# Patient Record
Sex: MALE | Race: WHITE | Employment: PART TIME | URBAN - METROPOLITAN AREA
[De-identification: names, ages, dates, MRNs, and addresses within clinical notes are randomized per-mention and may not be internally consistent; named-entity substitution may affect disease eponyms.]

---

## 2017-07-10 ENCOUNTER — OFFICE VISIT (OUTPATIENT)
Dept: FAMILY MEDICINE | Facility: CLINIC | Age: 32
End: 2017-07-10
Payer: COMMERCIAL

## 2017-07-10 VITALS
HEIGHT: 68 IN | RESPIRATION RATE: 16 BRPM | TEMPERATURE: 98.3 F | BODY MASS INDEX: 28.34 KG/M2 | DIASTOLIC BLOOD PRESSURE: 85 MMHG | HEART RATE: 91 BPM | SYSTOLIC BLOOD PRESSURE: 132 MMHG | WEIGHT: 187 LBS

## 2017-07-10 DIAGNOSIS — R10.31 GROIN PAIN, RIGHT: ICD-10-CM

## 2017-07-10 DIAGNOSIS — M25.552 HIP PAIN, LEFT: ICD-10-CM

## 2017-07-10 DIAGNOSIS — R10.9 ABDOMINAL PAIN, UNSPECIFIED LOCATION: Primary | ICD-10-CM

## 2017-07-10 LAB
ERYTHROCYTE [DISTWIDTH] IN BLOOD BY AUTOMATED COUNT: 12.1 % (ref 10–15)
ERYTHROCYTE [SEDIMENTATION RATE] IN BLOOD BY WESTERGREN METHOD: 8 MM/H (ref 0–15)
HCT VFR BLD AUTO: 43.7 % (ref 40–53)
HGB BLD-MCNC: 15.1 G/DL (ref 13.3–17.7)
MCH RBC QN AUTO: 29.8 PG (ref 26.5–33)
MCHC RBC AUTO-ENTMCNC: 34.6 G/DL (ref 31.5–36.5)
MCV RBC AUTO: 86 FL (ref 78–100)
PLATELET # BLD AUTO: 340 10E9/L (ref 150–450)
RBC # BLD AUTO: 5.07 10E12/L (ref 4.4–5.9)
WBC # BLD AUTO: 13.2 10E9/L (ref 4–11)

## 2017-07-10 PROCEDURE — 85652 RBC SED RATE AUTOMATED: CPT | Performed by: PHYSICIAN ASSISTANT

## 2017-07-10 PROCEDURE — 99214 OFFICE O/P EST MOD 30 MIN: CPT | Performed by: PHYSICIAN ASSISTANT

## 2017-07-10 PROCEDURE — 85027 COMPLETE CBC AUTOMATED: CPT | Performed by: PHYSICIAN ASSISTANT

## 2017-07-10 PROCEDURE — 36415 COLL VENOUS BLD VENIPUNCTURE: CPT | Performed by: PHYSICIAN ASSISTANT

## 2017-07-10 NOTE — NURSING NOTE
"Chief Complaint   Patient presents with     Musculoskeletal Problem       Initial /85 (BP Location: Right arm, Patient Position: Chair, Cuff Size: Adult Large)  Pulse 91  Temp 98.3  F (36.8  C) (Tympanic)  Resp 16  Ht 5' 8\" (1.727 m)  Wt 187 lb (84.8 kg)  BMI 28.43 kg/m2 Estimated body mass index is 28.43 kg/(m^2) as calculated from the following:    Height as of this encounter: 5' 8\" (1.727 m).    Weight as of this encounter: 187 lb (84.8 kg).  Medication Reconciliation: complete    Health Maintenance that is potentially due pending provider review:  NONE    Joan EVANS MA        "

## 2017-07-10 NOTE — PROGRESS NOTES
"  SUBJECTIVE:                                                    Marcos Obrien is a 31 year old male who presents to clinic today for the following health issues:      * Hip pain- Right hip, Has been going on for about 6 months, but on Friday became worse.  Sitting and stairs make the pain worse.  Has tried Aleve, essential oils, no ice/heat.  Was in about 10 years ago, was diagnosed with a hernia, pain is in same area    Started in R hip, could barely walk this weekend.  Now seems to be moving and more in abdomen.  Less pain if keeps moving, but hurts if strenuous movement - stairs, jumping, sitting.  Not affected by food or alcohol.  No nausea.  No change in appetite or BMs.  No urinary symptoms.  No history kidney stones.  Feels had similar symptoms years ago diagnosed as hernia, but Dr Garner's note specifically states no hernia.  Patient does state had a pain in groin with reducible bulge.    Problem list and histories reviewed & adjusted, as indicated.  Additional history: as documented    Reviewed and updated as needed this visit by clinical staff       Reviewed and updated as needed this visit by Provider         ROS:  Constitutional, GI, , musculoskeletal systems are negative, except as otherwise noted.    OBJECTIVE:     /85 (BP Location: Right arm, Patient Position: Chair, Cuff Size: Adult Large)  Pulse 91  Temp 98.3  F (36.8  C) (Tympanic)  Resp 16  Ht 5' 8\" (1.727 m)  Wt 187 lb (84.8 kg)  BMI 28.43 kg/m2  Body mass index is 28.43 kg/(m^2).  GENERAL: healthy, alert and no distress  ABDOMEN: soft, tender near upper left of umbilicus and also suprapubic, no hepatosplenomegaly, no masses and bowel sounds normal.  Neg heel jar and psoas and obturator signs.  Hip: pain is lateral hip and not currently tender to palpation but patient states was tender here over weekend.  Normal int and ext rotation, flexion, abduction and adduction.    : normal penis without discharge, normal testicles " without lump, firmness or asymmetry, and no hernia appreciated.  Groin pain is at location typical of inguinal hernia.    ASSESSMENT/PLAN:       ICD-10-CM    1. Abdominal pain, unspecified location R10.9 CBC with platelets     Erythrocyte sedimentation rate auto   2. Hip pain, left M25.552 CBC with platelets     Erythrocyte sedimentation rate auto   3. Groin pain, right R10.31 CBC with platelets     Erythrocyte sedimentation rate auto       Patient Instructions   No obvious hernia on exam today or with general surgeon in 2007, but what you described of having to push in 10 yrs ago could have been hernia.  Would be seen on CT.    Abdominal pain - unclear cause.  Some aspects suggest could be appendix, but pain is not classic and in more locations.  Can cause groin pain, not outside hip pain.      Hip - vague, doesn't fit any one particular diagnosis.    Collectively - decided to do labs to check a bit for appendicitis.  Only can be fully ruled out by CT.  If labs abnormal, get CT.  If pain starts worsening again, get CT.    Call if questions.      Rosemary To PA-C  [facility]

## 2017-07-10 NOTE — MR AVS SNAPSHOT
After Visit Summary   7/10/2017    Marcos Obrien    MRN: [id number]           Patient Information     Date Of Birth          1985        Visit Information        Provider Department      7/10/2017 6:20 PM Rosemary To PA-C [facility]        Today's Diagnoses     Abdominal pain, unspecified location    -  1    Hip pain, left        Groin pain, right          Care Instructions    No obvious hernia on exam today or with general surgeon in 2007, but what you described of having to push in 10 yrs ago could have been hernia.  Would be seen on CT.    Abdominal pain - unclear cause.  Some aspects suggest could be appendix, but pain is not classic and in more locations.  Can cause groin pain, not outside hip pain.      Hip - vague, doesn't fit any one particular diagnosis.    Collectively - decided to do labs to check a bit for appendicitis.  Only can be fully ruled out by CT.  If labs abnormal, get CT.  If pain starts worsening again, get CT.    Call if questions.          Follow-ups after your visit        Who to contact     If you have questions or need follow up information about today's clinic visit or your schedule please contact [facility] directly at [phone].  Normal or non-critical lab and imaging results will be communicated to you by [employer]art, letter or phone within 4 business days after the clinic has received the results. If you do not hear from us within 7 days, please contact the clinic through [employer] or phone. If you have a critical or abnormal lab result, we will notify you by phone as soon as possible.  Submit refill requests through userfox or call your pharmacy and they will forward the refill request to us. Please allow 3 business days for your refill to be completed.          Additional Information About Your Visit        [employer] Information     userfox lets you send messages to your doctor, view your test results, renew your  "prescriptions, schedule appointments and more. To sign up, go to [email or web address] . Click on \"Log in\" on the left side of the screen, which will take you to the Welcome page. Then click on \"Sign up Now\" on the right side of the page.     You will be asked to enter the access code listed below, as well as some personal information. Please follow the directions to create your username and password.     Your access code is: [email or web address]  Expires: 10/8/2017  6:50 PM     Your access code will  in 90 days. If you need help or a new code, please call your [place] clinic or [phone].        Care EveryWhere ID     This is your Care EveryWhere ID. This could be used by other organizations to access your [place] medical records  [id number]        Your Vitals Were     Pulse Temperature Respirations Height BMI (Body Mass Index)       91 98.3  F (36.8  C) (Tympanic) 16 5' 8\" (1.727 m) 28.43 kg/m2        Blood Pressure from Last 3 Encounters:   07/10/17 132/85   01/09/15 122/64   14 129/74    Weight from Last 3 Encounters:   07/10/17 187 lb (84.8 kg)   12 174 lb (78.9 kg)   07 180 lb (81.6 kg)              We Performed the Following     CBC with platelets     Erythrocyte sedimentation rate auto          Today's Medication Changes          These changes are accurate as of: 7/10/17  6:50 PM.  If you have any questions, ask your nurse or doctor.               Stop taking these medicines if you haven't already. Please contact your care team if you have questions.     albuterol 108 (90 BASE) MCG/ACT Inhaler   Commonly known as:  albuterol   Stopped by:  Rosemary To PA-C           guaiFENesin-codeine 100-10 MG/5ML Soln solution   Commonly known as:  ROBITUSSIN AC   Stopped by:  Rosemary To PA-C           hydrocortisone 2.5 % cream   Commonly known as:  ANUSOL-HC   Stopped by:  Rosemary To PA-C                    Primary Care Provider Office Phone # Fax #    Rosemary " SERGIO To PA-C [phone]       [facility] [address] [facility] [address]        Equal Access to Services     JOEY PETER : Hadii aad ku hadaimedayo Sokushali, watamda luqadaha, qajose manuelta kaalmada caterina, tessa jakein hayaashahla barrettjoanna will laDezcasie white. So [facility] [phone].    ATENCIÓN: Si habla español, tiene a ch disposición servicios gratuitos de asistencia lingüística. Llame al [phone].    We comply with applicable federal civil rights laws and [place] laws. We do not discriminate on the basis of race, color, national origin, age, disability sex, sexual orientation or gender identity.            Thank you!     Thank you for choosing [facility]  for your care. Our goal is always to provide you with excellent care. Hearing back from our patients is one way we can continue to improve our services. Please take a few minutes to complete the written survey that you may receive in the mail after your visit with us. Thank you!             Your Updated Medication List - Protect others around you: Learn how to safely use, store and throw away your medicines at www.disposemymeds.org.          This list is accurate as of: 7/10/17  6:50 PM.  Always use your most recent med list.                   Brand Name Dispense Instructions for use Diagnosis    NO ACTIVE MEDICATIONS      .

## 2017-07-10 NOTE — PATIENT INSTRUCTIONS
No obvious hernia on exam today or with general surgeon in 2007, but what you described of having to push in 10 yrs ago could have been hernia.  Would be seen on CT.    Abdominal pain - unclear cause.  Some aspects suggest could be appendix, but pain is not classic and in more locations.  Can cause groin pain, not outside hip pain.      Hip - vague, doesn't fit any one particular diagnosis.    Collectively - decided to do labs to check a bit for appendicitis.  Only can be fully ruled out by CT.  If labs abnormal, get CT.  If pain starts worsening again, get CT.    Call if questions.

## 2018-07-13 ENCOUNTER — TELEPHONE (OUTPATIENT)
Dept: FAMILY MEDICINE | Facility: CLINIC | Age: 33
End: 2018-07-13

## 2018-07-13 ENCOUNTER — APPOINTMENT (OUTPATIENT)
Dept: CT IMAGING | Facility: CLINIC | Age: 33
End: 2018-07-13
Attending: EMERGENCY MEDICINE
Payer: COMMERCIAL

## 2018-07-13 ENCOUNTER — HOSPITAL ENCOUNTER (EMERGENCY)
Facility: CLINIC | Age: 33
Discharge: HOME | End: 2018-07-13
Attending: EMERGENCY MEDICINE | Admitting: EMERGENCY MEDICINE
Payer: COMMERCIAL

## 2018-07-13 VITALS
RESPIRATION RATE: 14 BRPM | DIASTOLIC BLOOD PRESSURE: 91 MMHG | OXYGEN SATURATION: 96 % | SYSTOLIC BLOOD PRESSURE: 128 MMHG | TEMPERATURE: 98.1 F

## 2018-07-13 DIAGNOSIS — R10.31 ABDOMINAL PAIN, RIGHT LOWER QUADRANT: ICD-10-CM

## 2018-07-13 LAB
ALBUMIN SERPL-MCNC: 3.9 G/DL (ref 3.4–5)
ALBUMIN UR-MCNC: NEGATIVE MG/DL
ALP SERPL-CCNC: 72 U/L (ref 40–150)
ALT SERPL W P-5'-P-CCNC: 24 U/L (ref 0–70)
ANION GAP SERPL CALCULATED.3IONS-SCNC: 4 MMOL/L (ref 3–14)
APPEARANCE UR: CLEAR
AST SERPL W P-5'-P-CCNC: 13 U/L (ref 0–45)
BASOPHILS # BLD AUTO: 0.1 10E9/L (ref 0–0.2)
BASOPHILS NFR BLD AUTO: 0.6 %
BILIRUB SERPL-MCNC: 0.4 MG/DL (ref 0.2–1.3)
BILIRUB UR QL STRIP: NEGATIVE
BUN SERPL-MCNC: 18 MG/DL (ref 7–30)
CALCIUM SERPL-MCNC: 9.2 MG/DL (ref 8.5–10.1)
CHLORIDE SERPL-SCNC: 106 MMOL/L (ref 94–109)
CO2 SERPL-SCNC: 30 MMOL/L (ref 20–32)
COLOR UR AUTO: ABNORMAL
CREAT SERPL-MCNC: 0.98 MG/DL (ref 0.66–1.25)
DIFFERENTIAL METHOD BLD: NORMAL
EOSINOPHIL # BLD AUTO: 0.4 10E9/L (ref 0–0.7)
EOSINOPHIL NFR BLD AUTO: 3.5 %
ERYTHROCYTE [DISTWIDTH] IN BLOOD BY AUTOMATED COUNT: 11.4 % (ref 10–15)
GFR SERPL CREATININE-BSD FRML MDRD: 87 ML/MIN/1.7M2
GLUCOSE SERPL-MCNC: 94 MG/DL (ref 70–99)
GLUCOSE UR STRIP-MCNC: NEGATIVE MG/DL
HCT VFR BLD AUTO: 44.7 % (ref 40–53)
HGB BLD-MCNC: 14.7 G/DL (ref 13.3–17.7)
HGB UR QL STRIP: ABNORMAL
IMM GRANULOCYTES # BLD: 0 10E9/L (ref 0–0.4)
IMM GRANULOCYTES NFR BLD: 0.3 %
KETONES UR STRIP-MCNC: NEGATIVE MG/DL
LEUKOCYTE ESTERASE UR QL STRIP: NEGATIVE
LYMPHOCYTES # BLD AUTO: 2.2 10E9/L (ref 0.8–5.3)
LYMPHOCYTES NFR BLD AUTO: 21.3 %
MCH RBC QN AUTO: 29.1 PG (ref 26.5–33)
MCHC RBC AUTO-ENTMCNC: 32.9 G/DL (ref 31.5–36.5)
MCV RBC AUTO: 89 FL (ref 78–100)
MONOCYTES # BLD AUTO: 0.7 10E9/L (ref 0–1.3)
MONOCYTES NFR BLD AUTO: 6.4 %
NEUTROPHILS # BLD AUTO: 7 10E9/L (ref 1.6–8.3)
NEUTROPHILS NFR BLD AUTO: 67.9 %
NITRATE UR QL: NEGATIVE
NRBC # BLD AUTO: 0 10*3/UL
NRBC BLD AUTO-RTO: 0 /100
PH UR STRIP: 6 PH (ref 5–7)
PLATELET # BLD AUTO: 304 10E9/L (ref 150–450)
POTASSIUM SERPL-SCNC: 4.3 MMOL/L (ref 3.4–5.3)
PROT SERPL-MCNC: 7.7 G/DL (ref 6.8–8.8)
RBC # BLD AUTO: 5.05 10E12/L (ref 4.4–5.9)
RBC #/AREA URNS AUTO: 1 /HPF (ref 0–2)
SODIUM SERPL-SCNC: 140 MMOL/L (ref 133–144)
SOURCE: ABNORMAL
SP GR UR STRIP: 1.05 (ref 1–1.03)
SQUAMOUS #/AREA URNS AUTO: <1 /HPF (ref 0–1)
UROBILINOGEN UR STRIP-MCNC: 0 MG/DL (ref 0–2)
WBC # BLD AUTO: 10.3 10E9/L (ref 4–11)
WBC #/AREA URNS AUTO: <1 /HPF (ref 0–5)

## 2018-07-13 PROCEDURE — 80053 COMPREHEN METABOLIC PANEL: CPT | Performed by: EMERGENCY MEDICINE

## 2018-07-13 PROCEDURE — 81003 URINALYSIS AUTO W/O SCOPE: CPT | Performed by: EMERGENCY MEDICINE

## 2018-07-13 PROCEDURE — 25000128 H RX IP 250 OP 636: Performed by: EMERGENCY MEDICINE

## 2018-07-13 PROCEDURE — 99284 EMERGENCY DEPT VISIT MOD MDM: CPT | Mod: Z6 | Performed by: EMERGENCY MEDICINE

## 2018-07-13 PROCEDURE — 99284 EMERGENCY DEPT VISIT MOD MDM: CPT | Mod: 25

## 2018-07-13 PROCEDURE — 85025 COMPLETE CBC W/AUTO DIFF WBC: CPT | Performed by: EMERGENCY MEDICINE

## 2018-07-13 PROCEDURE — 74177 CT ABD & PELVIS W/CONTRAST: CPT

## 2018-07-13 PROCEDURE — 25000125 ZZHC RX 250: Performed by: EMERGENCY MEDICINE

## 2018-07-13 RX ORDER — IOPAMIDOL 755 MG/ML
91 INJECTION, SOLUTION INTRAVASCULAR ONCE
Status: COMPLETED | OUTPATIENT
Start: 2018-07-13 | End: 2018-07-13

## 2018-07-13 RX ADMIN — SODIUM CHLORIDE 73 ML: 9 INJECTION, SOLUTION INTRAVENOUS at 15:09

## 2018-07-13 RX ADMIN — IOPAMIDOL 91 ML: 755 INJECTION, SOLUTION INTRAVENOUS at 15:09

## 2018-07-13 NOTE — ED PROVIDER NOTES
History     Chief Complaint   Patient presents with     Abdominal Pain     RLQ into testicle, started 2 days ago     HPI  Marcos Obrien is a 32 year old male who presents the right lower quadrant abdominal pain.  Onset 2 days ago.  States discomfort at times moves into the testicle.  Denies fever, chills, night sweats.  Normal appetite.  No constipation or diarrhea.  Denies dysuria urgency or frequency.  Rates abdominal pain 2/10 intensity.  Has not altered activity.  No abdominal wall injury.  Recent heavy lifting or straining.     Problem List:    There are no active problems to display for this patient.       Past Medical History:    Past Medical History:   Diagnosis Date     Tobacco use disorder age 10       Past Surgical History:    No past surgical history on file.    Family History:    Family History   Problem Relation Age of Onset     Family History Negative No family hx of        Social History:  Marital Status:  Single [1]  Social History   Substance Use Topics     Smoking status: Former Smoker     Packs/day: 1.00     Years: 12.00     Types: Cigarettes     Quit date: 4/9/2012     Smokeless tobacco: Never Used     Alcohol use Yes        Medications:      NO ACTIVE MEDICATIONS         Review of Systems  All pertinent positives and negatives are documented in the HPI.  All others reviewed and are negative .    Physical Exam   BP: 131/88  Heart Rate: 67  Temp: 98.1  F (36.7  C)  Resp: 14  SpO2: 100 %      Physical Exam  Vital signs reviewed  Head:  Normocephalic.    Eyes:  PERRLA, full EOM.  External exams normal.    Ears:  Normal pinnae, canals, and TM's.    Nose:  Patent, without deformity.    Throat:  Moist mucous membranes without lesions, erythema, or exudate.    Neck:  Supple, without masses, lymphadenopathy or tenderness.    Respiratory:  Normal respiratory effort.  Lungs are clear with good breath sounds.    Heart:  RR without murmurs, rubs, or gallops.  Abdomen: No distention.  Bowel sounds  present.  Pain localizes with deep firm palpation right lower quadrant only.  There is no guarding or rebound.  Negative Rovsing sign.  Genital: Normal male genitalia.  Circumcised.  Testes normal.  Scrotum normal.  No inguinal hernia.  No inguinal pain.  No clinical signs for epididymitis, orchitis, hydrocele.  Skin:  Smooth without excessive sweating, with normal hair distribution.  No suspicious lesions visible.        ED Course     ED Course     Procedures                   Results for orders placed or performed during the hospital encounter of 07/13/18 (from the past 24 hour(s))   CBC with platelets differential   Result Value Ref Range    WBC 10.3 4.0 - 11.0 10e9/L    RBC Count 5.05 4.4 - 5.9 10e12/L    Hemoglobin 14.7 13.3 - 17.7 g/dL    Hematocrit 44.7 40.0 - 53.0 %    MCV 89 78 - 100 fl    MCH 29.1 26.5 - 33.0 pg    MCHC 32.9 31.5 - 36.5 g/dL    RDW 11.4 10.0 - 15.0 %    Platelet Count 304 150 - 450 10e9/L    Diff Method Automated Method     % Neutrophils 67.9 %    % Lymphocytes 21.3 %    % Monocytes 6.4 %    % Eosinophils 3.5 %    % Basophils 0.6 %    % Immature Granulocytes 0.3 %    Nucleated RBCs 0 0 /100    Absolute Neutrophil 7.0 1.6 - 8.3 10e9/L    Absolute Lymphocytes 2.2 0.8 - 5.3 10e9/L    Absolute Monocytes 0.7 0.0 - 1.3 10e9/L    Absolute Eosinophils 0.4 0.0 - 0.7 10e9/L    Absolute Basophils 0.1 0.0 - 0.2 10e9/L    Abs Immature Granulocytes 0.0 0 - 0.4 10e9/L    Absolute Nucleated RBC 0.0    Comprehensive metabolic panel   Result Value Ref Range    Sodium 140 133 - 144 mmol/L    Potassium 4.3 3.4 - 5.3 mmol/L    Chloride 106 94 - 109 mmol/L    Carbon Dioxide 30 20 - 32 mmol/L    Anion Gap 4 3 - 14 mmol/L    Glucose 94 70 - 99 mg/dL    Urea Nitrogen 18 7 - 30 mg/dL    Creatinine 0.98 0.66 - 1.25 mg/dL    GFR Estimate 87 >60 mL/min/1.7m2    GFR Estimate If Black >90 >60 mL/min/1.7m2    Calcium 9.2 8.5 - 10.1 mg/dL    Bilirubin Total 0.4 0.2 - 1.3 mg/dL    Albumin 3.9 3.4 - 5.0 g/dL    Protein  Total 7.7 6.8 - 8.8 g/dL    Alkaline Phosphatase 72 40 - 150 U/L    ALT 24 0 - 70 U/L    AST 13 0 - 45 U/L   CT Abdomen Pelvis w Contrast    Narrative    CT ABDOMEN AND PELVIS WITH CONTRAST   7/13/2018 3:15 PM     HISTORY:  Three-day history for right lower quadrant abdominal pain,  differential diagnosis includes: Appendicitis, ureterolithiasis,  colitis.     TECHNIQUE:   CT of the abdomen and pelvis was performed following the  administration of 91 mL Isovue 370. Radiation dose for this scan was  reduced using automated exposure control, adjustment of the mA and/or  kV according to patient size, or iterative reconstruction technique.    COMPARISON: None.    FINDINGS:  The appendix appears grossly unremarkable. No inflammatory  changes are noted in the right lower quadrant adjacent to the  appendix. The bowel appears grossly unremarkable. There is no free  fluid or free air in the abdomen or pelvis. There are a few  normal-sized mesenteric lymph nodes in the right abdomen.    The visualized solid organs in the abdomen appear unremarkable. There  is minimal bibasilar atelectasis.      Impression    IMPRESSION: No definite acute abnormality on CT.    KEENAN ZUNIGA MD   UA reflex to Microscopic   Result Value Ref Range    Color Urine Straw     Appearance Urine Clear     Glucose Urine Negative NEG^Negative mg/dL    Bilirubin Urine Negative NEG^Negative    Ketones Urine Negative NEG^Negative mg/dL    Specific Gravity Urine 1.050 (H) 1.003 - 1.035    Blood Urine Small (A) NEG^Negative    pH Urine 6.0 5.0 - 7.0 pH    Protein Albumin Urine Negative NEG^Negative mg/dL    Urobilinogen mg/dL 0.0 0.0 - 2.0 mg/dL    Nitrite Urine Negative NEG^Negative    Leukocyte Esterase Urine Negative NEG^Negative    Source Unspecified Urine     RBC Urine 1 0 - 2 /HPF    WBC Urine <1 0 - 5 /HPF    Squamous Epithelial /HPF Urine <1 0 - 1 /HPF       Medications   iopamidol (ISOVUE-370) solution 91 mL (91 mLs Intravenous Given 7/13/18 8370)    sodium chloride 0.9 % bag 500mL for CT scan flush use (73 mLs Intravenous Given 7/13/18 4293)       Assessments & Plan (with Medical Decision Making) 32-year-old male presents with lower abdominal pain.  Right side.  Present for 2 days.  Examination noted minimal discomfort but what was present with deep palpation to localized over McBurney's point.  CBC normal UA normal and CT of the abdomen with IV and oral contrast was negative.  Genital examination identified no hernia or other  process accounting for pain.  No signs for renal lithiasis/ureteral stone.  Discharge patient.  Advised to monitor for fever, chills, worsening abdominal pain.  If noted return     I have reviewed the nursing notes.    I have reviewed the findings, diagnosis, plan and need for follow up with the patient.      New Prescriptions    No medications on file       Final diagnoses:   Abdominal pain, right lower quadrant       7/13/2018   [facility] EMERGENCY DEPARTMENT     Quinton Riley,   07/13/18 8771

## 2018-07-13 NOTE — ED AVS SNAPSHOT
[facility] Emergency Department    [address] [facility] [address]    Phone:  [phone]    Fax:  [phone]                                       Marcos Obrien   MRN: [id number]    Department:  [facility] Emergency Department   Date of Visit:  7/13/2018           Patient Information     Date Of Birth          1985        Your diagnoses for this visit were:     Abdominal pain, right lower quadrant        You were seen by Quinton Riley DO.        Discharge Instructions       Activity and diet as tolerated  Return for fever, nausea, vomiting, diarrhea, bloody stools, urinary difficulties, worsening abdominal pain.  If noted return to the emergency room per  Medical workup at this time identified normal blood work, CT imaging, urinalysis.  No identified specific cause for abdominal pain.  Examination did not identify any evidence for hernia nor did CT imaging.    24 Hour Appointment Hotline       To make an appointment at any [facility], call [phone] ([phone]). If you don't have a family doctor or clinic, we will help you find one. [place] clinics are conveniently located to serve the needs of you and your family.             Review of your medicines      Our records show that you are taking the medicines listed below. If these are incorrect, please call your family doctor or clinic.        Dose / Directions Last dose taken    NO ACTIVE MEDICATIONS        .   Refills:  0                Procedures and tests performed during your visit     CBC with platelets differential    CT Abdomen Pelvis w Contrast    Comprehensive metabolic panel    Give 20 ounces of water 15 minutes before CT of abdomen    Peripheral IV catheter    UA reflex to Microscopic      Orders Needing Specimen Collection     None      Pending Results     No orders found from 7/11/2018 to 7/14/2018.            Pending Culture Results     No orders found from 7/11/2018 to 7/14/2018.             Pending Results Instructions     If you had any lab results that were not finalized at the time of your Discharge, you can call the ED Lab Result RN at [phone]. You will be contacted by this team for any positive Lab results or changes in treatment. The nurses are available 7 days a week from 10A to 6:30P.  You can leave a message 24 hours per day and they will return your call.        Test Results From Your Hospital Stay        7/13/2018  3:10 PM      Component Results     Component Value Ref Range & Units Status    WBC 10.3 4.0 - 11.0 10e9/L Final    RBC Count 5.05 4.4 - 5.9 10e12/L Final    Hemoglobin 14.7 13.3 - 17.7 g/dL Final    Hematocrit 44.7 40.0 - 53.0 % Final    MCV 89 78 - 100 fl Final    MCH 29.1 26.5 - 33.0 pg Final    MCHC 32.9 31.5 - 36.5 g/dL Final    RDW 11.4 10.0 - 15.0 % Final    Platelet Count 304 150 - 450 10e9/L Final    Diff Method Automated Method  Final    % Neutrophils 67.9 % Final    % Lymphocytes 21.3 % Final    % Monocytes 6.4 % Final    % Eosinophils 3.5 % Final    % Basophils 0.6 % Final    % Immature Granulocytes 0.3 % Final    Nucleated RBCs 0 0 /100 Final    Absolute Neutrophil 7.0 1.6 - 8.3 10e9/L Final    Absolute Lymphocytes 2.2 0.8 - 5.3 10e9/L Final    Absolute Monocytes 0.7 0.0 - 1.3 10e9/L Final    Absolute Eosinophils 0.4 0.0 - 0.7 10e9/L Final    Absolute Basophils 0.1 0.0 - 0.2 10e9/L Final    Abs Immature Granulocytes 0.0 0 - 0.4 10e9/L Final    Absolute Nucleated RBC 0.0  Final         7/13/2018  3:18 PM      Component Results     Component Value Ref Range & Units Status    Sodium 140 133 - 144 mmol/L Final    Potassium 4.3 3.4 - 5.3 mmol/L Final    Chloride 106 94 - 109 mmol/L Final    Carbon Dioxide 30 20 - 32 mmol/L Final    Anion Gap 4 3 - 14 mmol/L Final    Glucose 94 70 - 99 mg/dL Final    Urea Nitrogen 18 7 - 30 mg/dL Final    Creatinine 0.98 0.66 - 1.25 mg/dL Final    GFR Estimate 87 >60 mL/min/1.7m2 Final    Non  GFR Calc    GFR Estimate  If Black >90 >60 mL/min/1.7m2 Final    African American GFR Calc    Calcium 9.2 8.5 - 10.1 mg/dL Final    Bilirubin Total 0.4 0.2 - 1.3 mg/dL Final    Albumin 3.9 3.4 - 5.0 g/dL Final    Protein Total 7.7 6.8 - 8.8 g/dL Final    Alkaline Phosphatase 72 40 - 150 U/L Final    ALT 24 0 - 70 U/L Final    AST 13 0 - 45 U/L Final         7/13/2018  4:39 PM      Component Results     Component Value Ref Range & Units Status    Color Urine Straw  Final    Appearance Urine Clear  Final    Glucose Urine Negative NEG^Negative mg/dL Final    Bilirubin Urine Negative NEG^Negative Final    Ketones Urine Negative NEG^Negative mg/dL Final    Specific Gravity Urine 1.050 (H) 1.003 - 1.035 Final    Blood Urine Small (A) NEG^Negative Final    pH Urine 6.0 5.0 - 7.0 pH Final    Protein Albumin Urine Negative NEG^Negative mg/dL Final    Urobilinogen mg/dL 0.0 0.0 - 2.0 mg/dL Final    Nitrite Urine Negative NEG^Negative Final    Leukocyte Esterase Urine Negative NEG^Negative Final    Source Unspecified Urine  Final    RBC Urine 1 0 - 2 /HPF Final    WBC Urine <1 0 - 5 /HPF Final    Squamous Epithelial /HPF Urine <1 0 - 1 /HPF Final         7/13/2018  3:31 PM      Narrative     CT ABDOMEN AND PELVIS WITH CONTRAST   7/13/2018 3:15 PM     HISTORY:  Three-day history for right lower quadrant abdominal pain,  differential diagnosis includes: Appendicitis, ureterolithiasis,  colitis.     TECHNIQUE:   CT of the abdomen and pelvis was performed following the  administration of 91 mL Isovue 370. Radiation dose for this scan was  reduced using automated exposure control, adjustment of the mA and/or  kV according to patient size, or iterative reconstruction technique.    COMPARISON: None.    FINDINGS:  The appendix appears grossly unremarkable. No inflammatory  changes are noted in the right lower quadrant adjacent to the  appendix. The bowel appears grossly unremarkable. There is no free  fluid or free air in the abdomen or pelvis. There are a  "few  normal-sized mesenteric lymph nodes in the right abdomen.    The visualized solid organs in the abdomen appear unremarkable. There  is minimal bibasilar atelectasis.        Impression     IMPRESSION: No definite acute abnormality on CT.    KEENAN ZUNIGA MD                Thank you for choosing [facility]       Thank you for choosing [facility] for your care. Our goal is always to provide you with excellent care. Hearing back from our patients is one way we can continue to improve our services. Please take a few minutes to complete the written survey that you may receive in the mail after you visit with us. Thank you!        MyPublisher Information     MyPublisher lets you send messages to your doctor, view your test results, renew your prescriptions, schedule appointments and more. To sign up, go to [email or web address] . Click on \"Log in\" on the left side of the screen, which will take you to the Welcome page. Then click on \"Sign up Now\" on the right side of the page.     You will be asked to enter the access code listed below, as well as some personal information. Please follow the directions to create your username and password.     Your access code is: [email or web address]  Expires: 10/11/2018  5:35 PM     Your access code will  in 90 days. If you need help or a new code, please call your [facility] clinic or [phone].        Care EveryWhere ID     This is your Care EveryWhere ID. This could be used by other organizations to access your [facility] medical records  [id number]        Equal Access to Services     JOEY PETER : Hadii aad ku hadasho Soomaali, waaxda luqadaha, qaybta kaalmada adeegyada, tessa epstein . So [facility] [phone].    ATENCIÓN: Si habla español, tiene a ch disposición servicios gratuitos de asistencia lingüística. Llame al [phone].    We comply with applicable federal civil rights laws and [place] laws. We do not discriminate on the basis of race, color, " national origin, age, disability, sex, sexual orientation, or gender identity.            After Visit Summary       This is your record. Keep this with you and show to your community pharmacist(s) and doctor(s) at your next visit.

## 2018-07-13 NOTE — TELEPHONE ENCOUNTER
Marcos Obrien is a 32 year old male  who calls with abdominal pain.    NURSING ASSESSMENT:  The pain began 2-3 days ago.  He states about 12 yrs ago this first started.  Went to ER, was told it is nothing.  He states it has returned maybe 3-4 times since however this time the pain is more severe.  Pain scale (0-10): 4/10 when sharp pain not present, 9/10 when shooting pain present  LMP  was  N/A.  The pain is described as sharp and shooting and is located RLQ, which is with radiation to back, R groin, L groin and down left leg.  Symptom associated with the abdominal pain: none.  Patient has not had previous abdominal surgery, including none.  Pain is aggravated by could be anything.  He could be laying down and the pain will come on, and relieved by standing.  Allergies: No Known Allergies    MEDICATIONS:   Taking medication(s) as prescribed? No  Taking over the counter medication(s)? No  Any medication side effects? Not Applicable    Any barriers to taking medication(s) as prescribed?  No  Medication(s) improving/managing symptoms?  No  Medication reconciliation completed: Yes    NURSING PLAN: Nursing advice to patient go to ER for further evaulation due to abdominal pain with radiating back/leg/groin pain.    RECOMMENDED DISPOSITION:  To ED, another person to drive.  Will comply with recommendation: Yes  If further questions/concerns or if symptoms do not improve, worsen or new symptoms develop, call your PCP or [facility] as soon as possible.    Guideline used:  Telephone Triage Protocols for Nurses, Fifth Edition, Sarah Arroyo RN

## 2018-07-13 NOTE — DISCHARGE INSTRUCTIONS
Activity and diet as tolerated  Return for fever, nausea, vomiting, diarrhea, bloody stools, urinary difficulties, worsening abdominal pain.  If noted return to the emergency room per  Medical workup at this time identified normal blood work, CT imaging, urinalysis.  No identified specific cause for abdominal pain.  Examination did not identify any evidence for hernia nor did CT imaging.

## 2018-07-13 NOTE — ED AVS SNAPSHOT
[facility] Emergency Department    [address] [facility] [address]    Phone:  [phone]    Fax:  [phone]                                       Marcos Obrien   MRN: [id number]    Department:  [facility] Emergency Department   Date of Visit:  7/13/2018           After Visit Summary Signature Page     I have received my discharge instructions, and my questions have been answered. I have discussed any challenges I see with this plan with the nurse or doctor.    ..........................................................................................................................................  Patient/Patient Representative Signature      ..........................................................................................................................................  Patient Representative Print Name and Relationship to Patient    ..................................................               ................................................  Date                                            Time    ..........................................................................................................................................  Reviewed by Signature/Title    ...................................................              ..............................................  Date                                                            Time

## 2018-07-13 NOTE — ED NOTES
Pt started with RLQ pain 3 days ago, seems to be getting worse today. Is eating and drinking normally, denies fever/chills, N/V/D, UTI s/sx. Normal BM but pain is better after BM. Last foot intake 1100 and last liquid 1345. Pt also has less pain with sitting.

## 2018-12-18 ENCOUNTER — OFFICE VISIT (OUTPATIENT)
Dept: FAMILY MEDICINE | Facility: CLINIC | Age: 33
End: 2018-12-18
Payer: COMMERCIAL

## 2018-12-18 ENCOUNTER — ANCILLARY PROCEDURE (OUTPATIENT)
Dept: GENERAL RADIOLOGY | Facility: CLINIC | Age: 33
End: 2018-12-18
Attending: NURSE PRACTITIONER
Payer: COMMERCIAL

## 2018-12-18 VITALS
HEIGHT: 68 IN | WEIGHT: 191 LBS | RESPIRATION RATE: 18 BRPM | TEMPERATURE: 97.6 F | DIASTOLIC BLOOD PRESSURE: 80 MMHG | BODY MASS INDEX: 28.95 KG/M2 | HEART RATE: 100 BPM | SYSTOLIC BLOOD PRESSURE: 118 MMHG

## 2018-12-18 DIAGNOSIS — M70.41 PREPATELLAR BURSITIS OF RIGHT KNEE: Primary | ICD-10-CM

## 2018-12-18 DIAGNOSIS — M25.561 ACUTE PAIN OF RIGHT KNEE: ICD-10-CM

## 2018-12-18 PROCEDURE — 99214 OFFICE O/P EST MOD 30 MIN: CPT | Performed by: NURSE PRACTITIONER

## 2018-12-18 PROCEDURE — 73560 X-RAY EXAM OF KNEE 1 OR 2: CPT | Mod: RT

## 2018-12-18 RX ORDER — HYDROCODONE BITARTRATE AND ACETAMINOPHEN 5; 325 MG/1; MG/1
1 TABLET ORAL EVERY 6 HOURS PRN
Qty: 30 TABLET | Refills: 0 | Status: SHIPPED | OUTPATIENT
Start: 2018-12-18 | End: 2018-12-21

## 2018-12-18 ASSESSMENT — MIFFLIN-ST. JEOR: SCORE: 1780.87

## 2018-12-18 NOTE — NURSING NOTE
"Chief Complaint   Patient presents with     Knee Pain       Initial /80 (BP Location: Right arm, Cuff Size: Adult Regular)   Pulse 100   Temp 97.6  F (36.4  C) (Tympanic)   Resp 18   Ht 1.727 m (5' 8\")   Wt 86.6 kg (191 lb)   BMI 29.04 kg/m   Estimated body mass index is 29.04 kg/m  as calculated from the following:    Height as of this encounter: 1.727 m (5' 8\").    Weight as of this encounter: 86.6 kg (191 lb).    Patient presents to the clinic using No DME    Health Maintenance that is potentially due pending provider review:  NONE      Is there anyone who you would like to be able to receive your results? No  If yes have patient fill out [facility]      "

## 2018-12-18 NOTE — PROGRESS NOTES
SUBJECTIVE:   Marcos Obrien is a 33 year old male who presents to clinic today for the following health issues:    Knee Pain    Onset: 3 weeks    Description:   Location: right knee  Character: Sharp    Intensity: moderate    Progression of Symptoms: worse    Accompanying Signs & Symptoms:  Other symptoms: swelling    History:   Previous similar pain: no       Precipitating factors:   Trauma or overuse: no     Alleviating factors:  Improved by: nothing    Therapies Tried and outcome: ibuprofen, wearing a knee brace      Problem list and histories reviewed & adjusted, as indicated.  Additional history: as documented    There is no problem list on file for this patient.    History reviewed. No pertinent surgical history.    Social History     Tobacco Use     Smoking status: Former Smoker     Packs/day: 1.00     Years: 12.00     Pack years: 12.00     Types: Cigarettes     Last attempt to quit: 2012     Years since quittin.6     Smokeless tobacco: Never Used   Substance Use Topics     Alcohol use: Yes     Family History   Problem Relation Age of Onset     Family History Negative No family hx of          No current outpatient medications on file.     No Known Allergies  Recent Labs   Lab Test 18  1440   ALT 24   CR 0.98   GFRESTIMATED 87   GFRESTBLACK >90   POTASSIUM 4.3      BP Readings from Last 3 Encounters:   18 118/80   18 (!) 128/91   07/10/17 132/85    Wt Readings from Last 3 Encounters:   18 86.6 kg (191 lb)   07/10/17 84.8 kg (187 lb)   12 78.9 kg (174 lb)                    Reviewed and updated as needed this visit by clinical staff       Reviewed and updated as needed this visit by Provider         ROS:  Constitutional, HEENT, cardiovascular, pulmonary, gi and gu systems are negative, except as otherwise noted.    OBJECTIVE:     /80 (BP Location: Right arm, Cuff Size: Adult Regular)   Pulse 100   Temp 97.6  F (36.4  C) (Tympanic)   Resp 18   Ht 1.727 m (5'  "8\")   Wt 86.6 kg (191 lb)   BMI 29.04 kg/m    Body mass index is 29.04 kg/m .  GENERAL: healthy, alert and no distress  EYES: Eyes grossly normal to inspection, PERRL and conjunctivae and sclerae normal  NECK: no adenopathy, no asymmetry, masses, or scars and thyroid normal to palpation  RESP: lungs clear to auscultation - no rales, rhonchi or wheezes  CV: regular rate and rhythm, normal S1 S2, no S3 or S4, no murmur, click or rub, no peripheral edema and peripheral pulses strong  MS: no gross musculoskeletal defects noted, no edema  SKIN: no suspicious lesions or rashes  NEURO: Normal strength and tone, mentation intact and speech normal  PSYCH: mentation appears normal, affect normal/bright    Inspection: No effusion  Tender: patella tendon, prepatellar bursa, popliteal region  Non-tender: lateral joint line, medial joint line, medial tibial plateau, lateral tibial plateau  Active Range of Motion: pain with flexion, pain with extension  Strength: quad  5/5, Hamstrings  5/5, Gastroc  5/5, Tibialis anterior  5/5 and Peroneals  5/5  Special tests: normal Valgus stress test, normal Varus, negative Lachman's test    Also examined: hip full range of motion  Study Result     KNEE ONE-TWO VIEWS BILATERAL  12/18/2018 3:35 PM      HISTORY: Acute pain of right knee     COMPARISON: None.     FINDINGS: There is no significant degenerative change. No  suprapatellar effusion. There is no acute fracture. No dislocation.   There are no worrisome bony lesions.                                                                      IMPRESSION:  No acute osseous abnormality demonstrated.         ASSESSMENT/PLAN:     (M70.41) Prepatellar bursitis of right knee  (primary encounter diagnosis)  Comment: We will have patient wear knee brace and start physical therapy  Plan: HYDROcodone-acetaminophen (NORCO) 5-325 MG         tablet, PHYSICAL THERAPY REFERRAL    (M25.561) Acute pain of right knee  Comment:   Plan: XR Knee Bilateral 1/2 " Views, order for DME,         CANCELED: XR Knee Right 3 Views          LUCIA Trujillo CNP  [facility]

## 2018-12-18 NOTE — PATIENT INSTRUCTIONS
Rest the affected painful area as much as possible.  Apply ice for 15-20 minutes intermittently as needed and especially after any offending activity.    Daily stretching.  As pain recedes, begin normal activities slowly as tolerated.      Avoid rapid or heavy exertion for now. Activity as tolerated     Gentle stretching two to three times daily just to keep from stiffening up.      Alternate ice and heat, 20 minutes each for 2-3 cycles.  Gel packs work best for cold. Uncooked rice is best for heat.  Fill an old, clean sock and tie or sew up.  Microwave for 30-45 seconds. Careful not to burn.    Gradually ease back into activity as it gets better.  Consider Physical Therapy if symptoms not better with symptomatic care.        Patient Education     What Is Bursitis?  A bursa is a fluid-filled sac. It helps cushion the muscles, tendons, and bones around a joint. When a bursa becomes inflamed, it s called bursitis. Common symptoms are pain, tenderness, and swelling that limits movement of the joint.  What causes bursitis?  Bursitis is most often caused by overuse of a joint. The repeated movements bother the bursa and may cause it to swell. When that happens, other nearby tissues may become inflamed or have less space to move. Bursitis is most common in large joints such as the knee, shoulder, and hip.       Nonsurgical treatment involves both rest and exercise.      How is bursitis treated?  To help lessen pain and swelling, you may need one or more of these treatments:     Rest gives the bursa time to heal. This means limiting activities that put stress on the joint.    Ice may help. It's put on the area for 15 to 20 minutes several times a day, or as directed.    Anti-inflammatory medicines help with painful swelling. In some cases, this can be shots of cortisone or other steroid medicines into the bursa.    Splints and supportive bandages improve your comfort. They also allow the bursa to heal.    Physical therapy  may be used to gain flexibility and build up muscles that support the joint.    Aspiration removes extra fluid from the bursa using a needle. This can help your healthcare provider find out what is causing your bursitis. It might be an infection or overuse.     Surgery can be used to remove an inflamed or infected bursa. This is rarely needed.  Date Last Reviewed: 1/1/2018 2000-2018 [employer]. [address] [place] [address], [place] [address]. All rights reserved. This information is not intended as a substitute for professional medical care. Always follow your healthcare professional's instructions.

## 2020-02-04 ENCOUNTER — OFFICE VISIT (OUTPATIENT)
Dept: FAMILY MEDICINE | Facility: CLINIC | Age: 35
End: 2020-02-04
Payer: COMMERCIAL

## 2020-02-04 VITALS
HEART RATE: 90 BPM | DIASTOLIC BLOOD PRESSURE: 82 MMHG | HEIGHT: 68 IN | OXYGEN SATURATION: 97 % | TEMPERATURE: 98.5 F | WEIGHT: 189.4 LBS | BODY MASS INDEX: 28.7 KG/M2 | SYSTOLIC BLOOD PRESSURE: 124 MMHG | RESPIRATION RATE: 14 BRPM

## 2020-02-04 DIAGNOSIS — R22.41 KNEE MASS, RIGHT: ICD-10-CM

## 2020-02-04 DIAGNOSIS — F17.200 TOBACCO USE DISORDER: ICD-10-CM

## 2020-02-04 DIAGNOSIS — Z11.4 SCREENING FOR HUMAN IMMUNODEFICIENCY VIRUS: ICD-10-CM

## 2020-02-04 DIAGNOSIS — Z13.220 LIPID SCREENING: ICD-10-CM

## 2020-02-04 DIAGNOSIS — Z13.1 SCREENING FOR DIABETES MELLITUS: ICD-10-CM

## 2020-02-04 DIAGNOSIS — Z23 NEED FOR PROPHYLACTIC VACCINATION AND INOCULATION AGAINST INFLUENZA: ICD-10-CM

## 2020-02-04 DIAGNOSIS — G89.29 CHRONIC PAIN OF RIGHT KNEE: ICD-10-CM

## 2020-02-04 DIAGNOSIS — M25.561 CHRONIC PAIN OF RIGHT KNEE: ICD-10-CM

## 2020-02-04 DIAGNOSIS — Z00.00 ROUTINE GENERAL MEDICAL EXAMINATION AT A HEALTH CARE FACILITY: Primary | ICD-10-CM

## 2020-02-04 PROCEDURE — 99213 OFFICE O/P EST LOW 20 MIN: CPT | Mod: 25 | Performed by: FAMILY MEDICINE

## 2020-02-04 PROCEDURE — 90471 IMMUNIZATION ADMIN: CPT | Performed by: FAMILY MEDICINE

## 2020-02-04 PROCEDURE — 90686 IIV4 VACC NO PRSV 0.5 ML IM: CPT | Performed by: FAMILY MEDICINE

## 2020-02-04 PROCEDURE — 99395 PREV VISIT EST AGE 18-39: CPT | Mod: 25 | Performed by: FAMILY MEDICINE

## 2020-02-04 ASSESSMENT — MIFFLIN-ST. JEOR: SCORE: 1760.67

## 2020-02-04 NOTE — PROGRESS NOTES
3  SUBJECTIVE:   CC: Marcos Obrien is an 34 year old male who presents for preventive health visit.     Healthy Habits:    Do you get at least three servings of calcium containing foods daily (dairy, green leafy vegetables, etc.)? yes    Amount of exercise or daily activities, outside of work: none    Problems taking medications regularly not applicable    Medication side effects: Na    Have you had an eye exam in the past two years? no    Do you see a dentist twice per year? No, has not seen dentist for 10-15 yrs    Do you have sleep apnea, excessive snoring or daytime drowsiness?no    Patient was advised that any concern beyond preventive/wellness screenings or items may incur secondary charges in his medical bill. Patient concurred to proceed with the following:    Joint Pain and lump    Onset: 1 1/2 yrs ago    Description:   Location: right knee - anteromedial  Character: Sharp, cannot kneel due to lump and pain  Patient denies specific incident that started it,    Intensity: 3-7/10    Progression of Symptoms: worse    Accompanying Signs & Symptoms:  Other symptoms: swelling    History:   Previous similar pain: YES- has had trouble with knee being dislocated as a child, popped back in on its own    Patient was told last year was tendonitis. Patient was told the lump would resolve spontaneously, which did not.  He was recommended physical therapy.    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      Today's PHQ-2 Score:   PHQ-2 ( 1999 [employer]) 2/4/2020 12/18/2018   Q1: Little interest or pleasure in doing things 0 0   Q2: Feeling down, depressed or hopeless 0 0   PHQ-2 Score 0 0       Abuse: Current or Past(Physical, Sexual or Emotional)- No  Do you feel safe in your environment?  Yes        Social History     Tobacco Use     Smoking status: Current Every Day Smoker     Packs/day: 0.25     Years: 18.00     Pack years: 4.50     Types: Cigarettes, Cigars     Smokeless tobacco: Never Used     Tobacco comment: Also vapes.   Substance Use Topics     Alcohol use: Yes     Comment: social, rare     If you drink alcohol do you typically have >3 drinks per day or >7 drinks per week? No                      Last PSA: No results found for: PSA    Reviewed orders with patient. Reviewed health maintenance and updated orders accordingly - Yes  Patient Active Problem List   Diagnosis     BMI 29.0-29.9,adult     Knee mass, right     Chronic pain of right knee     Tobacco use disorder     History reviewed. No pertinent surgical history.    Social History     Tobacco Use     Smoking status: Current Every Day Smoker     Packs/day: 0.25     Years: 18.00     Pack years: 4.50     Types: Cigarettes, Cigars     Smokeless tobacco: Never Used     Tobacco comment: Also vapes.   Substance Use Topics     Alcohol use: Yes     Comment: social, rare     Family History   Problem Relation Age of Onset     Breast Cancer Mother      Parkinsonism Father      Cerebrovascular Disease Father      Family History Negative No family hx of          No current outpatient medications on file.     No Known Allergies    Reviewed and updated as needed this visit by clinical staff  Tobacco  Allergies  Meds  Problems  Med Hx  Surg Hx  Fam Hx  Soc Hx          Reviewed and updated as needed this visit by Provider  Tobacco  Allergies  Meds  Problems  Med Hx  Surg Hx  Fam Hx  Soc Hx         Past Medical History:   Diagnosis Date     Tobacco use disorder age 10      History reviewed. No pertinent surgical history.    ROS:  CONSTITUTIONAL: NEGATIVE for fever, chills, change in weight  INTEGUMENTARY/SKIN: NEGATIVE for worrisome rashes, moles or lesions  EYES: NEGATIVE for vision changes or irritation  ENT: NEGATIVE for ear, mouth and  "throat problems  RESP: NEGATIVE for significant cough or SOB  CV: NEGATIVE for chest pain, palpitations or peripheral edema  GI: NEGATIVE for nausea, abdominal pain, heartburn, or change in bowel habits   male: negative for dysuria, hematuria, decreased urinary stream, erectile dysfunction, urethral discharge  MUSCULOSKELETAL:see above  NEURO: NEGATIVE for weakness, dizziness or paresthesias  ENDOCRINE: NEGATIVE for temperature intolerance, skin/hair changes  HEME/ALLERGY/IMMUNE: NEGATIVE for bleeding problems  PSYCHIATRIC: NEGATIVE for changes in mood or affect    OBJECTIVE:   /82   Pulse 90   Temp 98.5  F (36.9  C) (Tympanic)   Resp 14   Ht 1.715 m (5' 7.5\")   Wt 85.9 kg (189 lb 6.4 oz)   SpO2 97%   BMI 29.23 kg/m    EXAM:  GENERAL APPEARANCE: overweight, alert and no distress  EYES: pink conj, no icterus, PERRL, EOMI  HENT: ear canals and TM's normal, nose and mouth without ulcers or lesions, oropharynx clear and oral mucous membranes moist  NECK: no adenopathy, no asymmetry, masses, or scars and thyroid normal to palpation  RESP: lungs clear to auscultation - no rales, rhonchi or wheezes  CV: regular rates and rhythm, normal S1 S2, no S3 or S4, no murmur, click or rub, no peripheral edema and peripheral pulses strong  ABDOMEN: soft, nontender, no hepatosplenomegaly, no masses and bowel sounds normal  RECTAL: deferred  MS:  gait is age appropriate without ataxia  SKIN: no suspicious lesions or rashes  NEURO: Normal strength and tone, sensory exam grossly normal, mentation intact and speech normal  RIGHT KNEE: no discoloration or diffuse swelling; 3 cm round boggy non-tender mass on anteromedial knee that intermittently reduces with palpation or movement of knee; no instability; negative drawer tests; positive pain on valgus stress medially; positive apprehension on lateral deviation of patella    Diagnostic Test Results:  none     ASSESSMENT/PLAN:   Marcos was seen today for physical, musculoskeletal " problem, flu shot and imm/inj.    Diagnoses and all orders for this visit:    Routine general medical examination at a health care facility  Patient was advised on recommended screening and preventive health recommendations.  He verbalized understanding and agreed to the plans below.    Chronic pain of right knee  -     MR Knee Right w/o Contrast; Future  Due to chronicity and findings on exam, suspect soft tissue structure derangement.  To rule out, imaging ordered. Patient concurred.  Activity as tolerated.  Depending on result, refer to appropriate ortho department.  Acetaminophen 500 mg orally 1-2 tabs every 4-6 hrs as needed for pain  Return precautions discussed and given to patient.    Knee mass, right  -     MR Knee Right w/o Contrast; Future  See above.  Likely effusion but unusual that there is a cystic structure anteriorly.  Other consideration is a lipoma?  Await MRI findings.    Lipid screening  -     Lipid panel reflex to direct LDL Fasting; Future    Screening for diabetes mellitus  -     Glucose; Future    Screening for human immunodeficiency virus  -     HIV Antigen Antibody Combo  Offered screening based on current recommendations. Patient concurred to screen.    BMI 29.0-29.9,adult  -     Glucose; Future  Patient was advised healthy diet recommendations.  Patient was advised weekly exercise recommendations and goals.    Tobacco use disorder  Advised risks of vapign use. Advised risks of tobacco use.  Advised recommendations on use of vaping to stop smoking.  See AVS.  Patient said he will do his best to do so.    Need for prophylactic vaccination and inoculation against influenza  -     INFLUENZA VACCINE IM > 6 MONTHS VALENT IIV4 [43372]  -     Vaccine Administration, Initial [76475]    In addition to preventive health visit, spent another 25 minutes in counseling and coordination of care as above.    COUNSELING:  Reviewed preventive health counseling, as reflected in patient  "instructions    Estimated body mass index is 29.23 kg/m  as calculated from the following:    Height as of this encounter: 1.715 m (5' 7.5\").    Weight as of this encounter: 85.9 kg (189 lb 6.4 oz).    Weight management plan: Discussed healthy diet and exercise guidelines     reports that he has been smoking cigarettes and cigars. He has a 4.50 pack-year smoking history. He has never used smokeless tobacco.  Tobacco Cessation Action Plan: Information offered: Patient not interested at this time  see above    Counseling Resources:  ATP IV Guidelines  Pooled Cohorts Equation Calculator  FRAX Risk Assessment  ICSI Preventive Guidelines  Dietary Guidelines for Americans, 2010  USDA's MyPlate  ASA Prophylaxis  Lung CA Screening    Sukhwinder Castillo MD  [facility]  "

## 2020-02-04 NOTE — PATIENT INSTRUCTIONS
Schedule your lab tests; make sure you are fasting for more than 12 hrs.    To schedule the MRI of the knee, call [phone].  Further recommendation to be given when results are available.    Acetaminophen 500 mg orally 1-2 tablets every 4-6 hrs as needed for pain.    Flu shot today.    Be consistent with low trans fat and saturated fat diet.  Eat food rich in omega-3-fatty acids as you tolerate. (salmon, olive oil)  Eat 5 cups of vegetables, fruits and whole grains per day.  Limit starchy food (white rice, white bread, white pasta, white potatoes) to less than a cup per meal.  Minimize sweets, junk food and fastfood. Limit soda beverages to one serving per day; best to avoid it altogether though.  Exercise: moderate intensity sustained for at least 30 mins per episode, goal of 150 mins per week at least  Combine cardiovascular and resistance exercises.  These exercise recommendations are in addition to your daily activity at work or home.  Work on losing weight if you are above your goal body mass index.    Work on stopping all nicotine and tobacco use.  Since already using a vape, stop cigars completely.  Then in next 4-8 weeks, wean off the vaping.  vaping is not meant for long term use, even in the setting of tobacco cessation.    Preventive Health Recommendations  Male Ages 26 - 39    Yearly exam:             See your health care provider every year in order to  o   Review health changes.   o   Discuss preventive care.    o   Review your medicines if your doctor has prescribed any.    You should be tested each year for STDs (sexually transmitted diseases), if you re at risk.     After age 35, talk to your provider about cholesterol testing. If you are at risk for heart disease, have your cholesterol tested at least every 5 years.     If you are at risk for diabetes, you should have a diabetes test (fasting glucose).  Shots: Get a flu shot each year. Get a tetanus shot every 10 years.     Nutrition:    Eat at  least 5 servings of fruits and vegetables daily.     Eat whole-grain bread, whole-wheat pasta and brown rice instead of white grains and rice.     Get adequate Calcium and Vitamin D.     Lifestyle    Exercise for at least 150 minutes a week (30 minutes a day, 5 days a week). This will help you control your weight and prevent disease.     Limit alcohol to one drink per day.     No smoking.     Wear sunscreen to prevent skin cancer.     See your dentist every six months for an exam and cleaning.

## 2020-02-05 DIAGNOSIS — Z13.1 SCREENING FOR DIABETES MELLITUS: ICD-10-CM

## 2020-02-05 DIAGNOSIS — Z13.220 LIPID SCREENING: ICD-10-CM

## 2020-02-05 LAB
CHOLEST SERPL-MCNC: 118 MG/DL
GLUCOSE SERPL-MCNC: 81 MG/DL (ref 70–99)
HDLC SERPL-MCNC: 49 MG/DL
LDLC SERPL CALC-MCNC: 59 MG/DL
NONHDLC SERPL-MCNC: 69 MG/DL
TRIGL SERPL-MCNC: 51 MG/DL

## 2020-02-05 PROCEDURE — 82947 ASSAY GLUCOSE BLOOD QUANT: CPT | Performed by: FAMILY MEDICINE

## 2020-02-05 PROCEDURE — 80061 LIPID PANEL: CPT | Performed by: FAMILY MEDICINE

## 2020-02-05 PROCEDURE — 36415 COLL VENOUS BLD VENIPUNCTURE: CPT | Performed by: FAMILY MEDICINE

## 2020-02-06 ENCOUNTER — HOSPITAL ENCOUNTER (OUTPATIENT)
Dept: GENERAL RADIOLOGY | Facility: CLINIC | Age: 35
End: 2020-02-06
Attending: FAMILY MEDICINE
Payer: COMMERCIAL

## 2020-02-06 ENCOUNTER — HOSPITAL ENCOUNTER (OUTPATIENT)
Dept: MRI IMAGING | Facility: CLINIC | Age: 35
Discharge: HOME | End: 2020-02-06
Attending: FAMILY MEDICINE | Admitting: FAMILY MEDICINE
Payer: COMMERCIAL

## 2020-02-06 DIAGNOSIS — S83.241A TEAR OF MEDIAL MENISCUS OF RIGHT KNEE, CURRENT, UNSPECIFIED TEAR TYPE, INITIAL ENCOUNTER: Primary | ICD-10-CM

## 2020-02-06 DIAGNOSIS — G89.29 CHRONIC PAIN OF RIGHT KNEE: ICD-10-CM

## 2020-02-06 DIAGNOSIS — R22.41 KNEE MASS, RIGHT: ICD-10-CM

## 2020-02-06 DIAGNOSIS — M25.561 CHRONIC PAIN OF RIGHT KNEE: ICD-10-CM

## 2020-02-06 DIAGNOSIS — Z87.821 HISTORY OF FOREIGN BODY IN EYE: ICD-10-CM

## 2020-02-06 DIAGNOSIS — M23.006 CYST OF MENISCUS OF RIGHT KNEE: ICD-10-CM

## 2020-02-06 PROCEDURE — 70030 X-RAY EYE FOR FOREIGN BODY: CPT

## 2020-02-06 PROCEDURE — 73721 MRI JNT OF LWR EXTRE W/O DYE: CPT | Mod: RT

## 2020-02-10 ENCOUNTER — TRANSFERRED RECORDS (OUTPATIENT)
Dept: HEALTH INFORMATION MANAGEMENT | Facility: CLINIC | Age: 35
End: 2020-02-10

## 2024-09-25 ENCOUNTER — ALLIED HEALTH/NURSE VISIT (OUTPATIENT)
Dept: FAMILY MEDICINE | Facility: CLINIC | Age: 39
End: 2024-09-25
Payer: COMMERCIAL

## 2024-09-25 DIAGNOSIS — Z23 ENCOUNTER FOR IMMUNIZATION: Primary | ICD-10-CM

## 2024-09-25 PROCEDURE — 99207 PR NO CHARGE NURSE ONLY: CPT

## 2024-09-25 PROCEDURE — 90471 IMMUNIZATION ADMIN: CPT

## 2024-09-25 PROCEDURE — 90715 TDAP VACCINE 7 YRS/> IM: CPT

## 2024-09-25 NOTE — PROGRESS NOTES
Prior to immunization administration, verified patients identity using patient s name and date of birth. Please see Immunization Activity for additional information.     Is the patient's temperature normal (100.5 or less)? Yes     Patient MEETS CRITERIA. PROCEED with vaccine administration.         No data to display                  Patient instructed to remain in clinic for 15 minutes afterwards, and to report any adverse reactions.      Link to Ancillary Visit Immunization Standing Orders SmartSet     Screening performed by Chloe Riley CMA on 9/25/2024 at 2:49 PM.